# Patient Record
Sex: MALE | Race: BLACK OR AFRICAN AMERICAN
[De-identification: names, ages, dates, MRNs, and addresses within clinical notes are randomized per-mention and may not be internally consistent; named-entity substitution may affect disease eponyms.]

---

## 2018-02-05 ENCOUNTER — HOSPITAL ENCOUNTER (EMERGENCY)
Dept: HOSPITAL 92 - ERS | Age: 68
Discharge: HOME | End: 2018-02-05
Payer: MEDICARE

## 2018-02-05 DIAGNOSIS — Z87.891: ICD-10-CM

## 2018-02-05 DIAGNOSIS — R10.31: Primary | ICD-10-CM

## 2018-02-05 DIAGNOSIS — I10: ICD-10-CM

## 2018-02-05 DIAGNOSIS — I11.0: ICD-10-CM

## 2018-02-05 DIAGNOSIS — Z86.73: ICD-10-CM

## 2018-02-05 DIAGNOSIS — I50.9: ICD-10-CM

## 2018-02-05 DIAGNOSIS — Z79.899: ICD-10-CM

## 2018-02-05 LAB
ALBUMIN SERPL BCG-MCNC: 4.2 G/DL (ref 3.4–4.8)
ALP SERPL-CCNC: 70 U/L (ref 40–150)
ALT SERPL W P-5'-P-CCNC: 16 U/L (ref 8–55)
ANION GAP SERPL CALC-SCNC: 12 MMOL/L (ref 10–20)
ANISOCYTOSIS BLD QL SMEAR: (no result) (100X)
AST SERPL-CCNC: 21 U/L (ref 5–34)
BASOPHILS # BLD AUTO: 0.1 THOU/UL (ref 0–0.2)
BASOPHILS NFR BLD AUTO: 1.1 % (ref 0–1)
BILIRUB SERPL-MCNC: 0.7 MG/DL (ref 0.2–1.2)
BUN SERPL-MCNC: 18 MG/DL (ref 8.4–25.7)
CALCIUM SERPL-MCNC: 9.2 MG/DL (ref 7.8–10.44)
CHLORIDE SERPL-SCNC: 105 MMOL/L (ref 98–107)
CK MB SERPL-MCNC: 7 NG/ML (ref 0–6.6)
CK SERPL-CCNC: 447 U/L (ref 30–200)
CO2 SERPL-SCNC: 28 MMOL/L (ref 23–31)
CREAT CL PREDICTED SERPL C-G-VRATE: 0 ML/MIN (ref 70–130)
EOSINOPHIL # BLD AUTO: 0.1 THOU/UL (ref 0–0.7)
EOSINOPHIL NFR BLD AUTO: 2.1 % (ref 0–10)
GLOBULIN SER CALC-MCNC: 3 G/DL (ref 2.4–3.5)
GLUCOSE SERPL-MCNC: 135 MG/DL (ref 80–115)
HGB BLD-MCNC: 15 G/DL (ref 14–18)
LYMPHOCYTES # BLD: 2.3 THOU/UL (ref 1.2–3.4)
LYMPHOCYTES NFR BLD AUTO: 33.6 % (ref 21–51)
MCH RBC QN AUTO: 23.2 PG (ref 27–31)
MCV RBC AUTO: 74.3 FL (ref 80–94)
MDIFF COMPLETE?: YES
MICROCYTES BLD QL SMEAR: (no result) (100X)
MONOCYTES # BLD AUTO: 0.7 THOU/UL (ref 0.11–0.59)
MONOCYTES NFR BLD AUTO: 10.4 % (ref 0–10)
NEUTROPHILS # BLD AUTO: 3.6 THOU/UL (ref 1.4–6.5)
NEUTROPHILS NFR BLD AUTO: 52.8 % (ref 42–75)
OVALOCYTES BLD QL SMEAR: (no result) (100X)
PLATELET # BLD AUTO: 154 THOU/UL (ref 130–400)
POTASSIUM SERPL-SCNC: 4.3 MMOL/L (ref 3.5–5.1)
RBC # BLD AUTO: 6.47 MILL/UL (ref 4.7–6.1)
SODIUM SERPL-SCNC: 141 MMOL/L (ref 136–145)
SP GR UR STRIP: 1.02 (ref 1–1.04)
TARGETS BLD QL SMEAR: (no result) (100X)
TROPONIN I SERPL DL<=0.01 NG/ML-MCNC: 0.04 NG/ML (ref ?–0.03)
WBC # BLD AUTO: 6.9 THOU/UL (ref 4.8–10.8)

## 2018-02-05 PROCEDURE — 82553 CREATINE MB FRACTION: CPT

## 2018-02-05 PROCEDURE — 74177 CT ABD & PELVIS W/CONTRAST: CPT

## 2018-02-05 PROCEDURE — 36415 COLL VENOUS BLD VENIPUNCTURE: CPT

## 2018-02-05 PROCEDURE — 93005 ELECTROCARDIOGRAM TRACING: CPT

## 2018-02-05 PROCEDURE — 85025 COMPLETE CBC W/AUTO DIFF WBC: CPT

## 2018-02-05 PROCEDURE — 82550 ASSAY OF CK (CPK): CPT

## 2018-02-05 PROCEDURE — 94760 N-INVAS EAR/PLS OXIMETRY 1: CPT

## 2018-02-05 PROCEDURE — 84484 ASSAY OF TROPONIN QUANT: CPT

## 2018-02-05 PROCEDURE — 96374 THER/PROPH/DIAG INJ IV PUSH: CPT

## 2018-02-05 PROCEDURE — 96372 THER/PROPH/DIAG INJ SC/IM: CPT

## 2018-02-05 PROCEDURE — 80053 COMPREHEN METABOLIC PANEL: CPT

## 2018-02-05 PROCEDURE — 71045 X-RAY EXAM CHEST 1 VIEW: CPT

## 2018-02-05 PROCEDURE — 81003 URINALYSIS AUTO W/O SCOPE: CPT

## 2018-02-05 PROCEDURE — 96375 TX/PRO/DX INJ NEW DRUG ADDON: CPT

## 2018-02-05 NOTE — CT
CT ABDOMEN AND PELVIS WITH CONTRAST:

 

Technique: Multiple axial tomograms were obtained through the abdomen and pelvis with IV enhancement.
 

 

History: Abdominal pain. 

 

Comparison: 12-13-17

 

FINDINGS: 

Lung bases are clear. The liver, spleen, and pancreas are unremarkable. Stomach and duodenum are unre
markable. 

 

Adrenal glands and kidneys are unremarkable. No hydronephrosis. 

 

Small bowel loops appear normal. Appendix is not identified. The colon is unremarkable. Airway normal
 caliber. No adenopathy. Images through the pelvis unremarkable. Prostate normal sized. 

 

IMPRESSION: 

No evidence of acute process. 

 

POS: [facility]

## 2018-02-05 NOTE — RAD
CHEST ONE VIEW:

 

History: 

67-year-old male with nausea and vomiting and abdominal pain which began approximately 2 hours ago. 

 

Comparison: 

12-13-17

 

FINDINGS: 

Heart size is within normal limits. Atherosclerotic ectatic changes of the aorta. Horizontal linear a
nd parenchymal changes noted in both bases, overall stable from prior studies. No overt edema, conflu
ent pneumonia, or other acute process. 

 

IMPRESSION: 

Stable horizontal linear and parenchymal changes in the bases. Atherosclerosis of the aorta with ecta
yunior. No acute intrathoracic disease. 

 

POS: [facility]

## 2018-03-07 ENCOUNTER — HOSPITAL ENCOUNTER (EMERGENCY)
Dept: HOSPITAL 92 - ERS | Age: 68
Discharge: HOME | End: 2018-03-07
Payer: MEDICARE

## 2018-03-07 DIAGNOSIS — Z87.891: ICD-10-CM

## 2018-03-07 DIAGNOSIS — Z86.73: ICD-10-CM

## 2018-03-07 DIAGNOSIS — I25.2: ICD-10-CM

## 2018-03-07 DIAGNOSIS — I11.0: ICD-10-CM

## 2018-03-07 DIAGNOSIS — R60.0: Primary | ICD-10-CM

## 2018-03-07 DIAGNOSIS — I67.1: ICD-10-CM

## 2018-03-07 DIAGNOSIS — I50.9: ICD-10-CM

## 2018-03-07 DIAGNOSIS — Z79.899: ICD-10-CM

## 2018-03-07 LAB
ALBUMIN SERPL BCG-MCNC: 4.2 G/DL (ref 3.4–4.8)
ALP SERPL-CCNC: 74 U/L (ref 40–150)
ALT SERPL W P-5'-P-CCNC: 15 U/L (ref 8–55)
ANION GAP SERPL CALC-SCNC: 13 MMOL/L (ref 10–20)
ANISOCYTOSIS BLD QL SMEAR: (no result) (100X)
AST SERPL-CCNC: 20 U/L (ref 5–34)
BASOPHILS # BLD AUTO: 0.1 THOU/UL (ref 0–0.2)
BASOPHILS NFR BLD AUTO: 1 % (ref 0–1)
BILIRUB SERPL-MCNC: 0.9 MG/DL (ref 0.2–1.2)
BUN SERPL-MCNC: 18 MG/DL (ref 8.4–25.7)
CALCIUM SERPL-MCNC: 9.7 MG/DL (ref 7.8–10.44)
CHLORIDE SERPL-SCNC: 101 MMOL/L (ref 98–107)
CK MB SERPL-MCNC: 6.1 NG/ML (ref 0–6.6)
CK SERPL-CCNC: 434 U/L (ref 30–200)
CO2 SERPL-SCNC: 25 MMOL/L (ref 23–31)
CREAT CL PREDICTED SERPL C-G-VRATE: 0 ML/MIN (ref 70–130)
EOSINOPHIL # BLD AUTO: 0.1 THOU/UL (ref 0–0.7)
EOSINOPHIL NFR BLD AUTO: 1 % (ref 0–10)
GLOBULIN SER CALC-MCNC: 3.5 G/DL (ref 2.4–3.5)
GLUCOSE SERPL-MCNC: 110 MG/DL (ref 80–115)
HGB BLD-MCNC: 15.6 G/DL (ref 14–18)
LYMPHOCYTES # BLD: 2.2 THOU/UL (ref 1.2–3.4)
LYMPHOCYTES NFR BLD AUTO: 24.3 % (ref 21–51)
MCH RBC QN AUTO: 23.2 PG (ref 27–31)
MCV RBC AUTO: 72.6 FL (ref 80–94)
MDIFF COMPLETE?: YES
MICROCYTES BLD QL SMEAR: (no result) (100X)
MONOCYTES # BLD AUTO: 1.3 THOU/UL (ref 0.11–0.59)
MONOCYTES NFR BLD AUTO: 14.1 % (ref 0–10)
NEUTROPHILS # BLD AUTO: 5.4 THOU/UL (ref 1.4–6.5)
NEUTROPHILS NFR BLD AUTO: 59.6 % (ref 42–75)
OVALOCYTES BLD QL SMEAR: (no result) (100X)
PLATELET # BLD AUTO: 165 THOU/UL (ref 130–400)
PLATELET BLD QL SMEAR: (no result)
POLYCHROMASIA BLD QL SMEAR: (no result) (100X)
POTASSIUM SERPL-SCNC: 3.9 MMOL/L (ref 3.5–5.1)
RBC # BLD AUTO: 6.7 MILL/UL (ref 4.7–6.1)
SODIUM SERPL-SCNC: 135 MMOL/L (ref 136–145)
TROPONIN I SERPL DL<=0.01 NG/ML-MCNC: 0.03 NG/ML (ref ?–0.03)
WBC # BLD AUTO: 9.1 THOU/UL (ref 4.8–10.8)

## 2018-03-07 PROCEDURE — 82553 CREATINE MB FRACTION: CPT

## 2018-03-07 PROCEDURE — 93970 EXTREMITY STUDY: CPT

## 2018-03-07 PROCEDURE — 36415 COLL VENOUS BLD VENIPUNCTURE: CPT

## 2018-03-07 PROCEDURE — 83880 ASSAY OF NATRIURETIC PEPTIDE: CPT

## 2018-03-07 PROCEDURE — 85025 COMPLETE CBC W/AUTO DIFF WBC: CPT

## 2018-03-07 PROCEDURE — 93005 ELECTROCARDIOGRAM TRACING: CPT

## 2018-03-07 PROCEDURE — 84484 ASSAY OF TROPONIN QUANT: CPT

## 2018-03-07 PROCEDURE — 80053 COMPREHEN METABOLIC PANEL: CPT

## 2018-03-07 PROCEDURE — 71045 X-RAY EXAM CHEST 1 VIEW: CPT

## 2018-03-07 NOTE — ULT
BILATERAL LOWER EXTREMITY VENOUS DUPLEX EXAM:

3/7/18

 

Deep veins of both lower extremities evaluated by ultrasound and doppler. Color doppler with spectral
 analysis and compression studies performed. 

 

INDICATIONS:

Bilateral lower extremity pain and edema. 

 

Deep veins of both lower extremities show normal blood flow and compression. No evidence of DVT. 

 

IMPRESSION:  

No evidence of lower extremity DVT. 

 

POS: [facility]

## 2018-03-07 NOTE — RAD
PORTABLE CHEST ONE VIEW:

 

Date: 3-7-18 

Time: 2:59 p.m.

 

History: Chest pain. 

 

FINDINGS/IMPRESSION: 

Comparison is made with exam of 2-5-18. 

 

The heart is enlarged. The aorta is tortuous. Chronic changes are again seen. No lobar consolidation,
 pneumothoraces, anne marie pulmonary edema or pleural effusions are identified. 

 

POS: [facility]

## 2018-03-10 NOTE — EKG
Test Reason : 

Blood Pressure : ***/*** mmHG

Vent. Rate : 083 BPM     Atrial Rate : 332 BPM

   P-R Int : 000 ms          QRS Dur : 138 ms

    QT Int : 390 ms       P-R-T Axes : 270 -68 079 degrees

   QTc Int : 458 ms

 

Atrial flutter with 4:1 A-V conduction

Right bundle branch block

Left anterior fascicular block

*** Bifascicular block ***

Inferior infarct , age undetermined

Cannot rule out Anterior infarct , age undetermined

Abnormal ECG

 

Confirmed by MIKE JORGE, PEDRO ([id number]),  MEGAN BENNETT ([id number]) on 3/10/2018 3:56:29 PM

 

Referred By:             Confirmed By:PEDRO MCKEON MD

## 2024-12-08 ENCOUNTER — HOSPITAL ENCOUNTER (EMERGENCY)
Dept: HOSPITAL 92 - CSHERS | Age: 74
Discharge: ANOTHER HOSPITAL | End: 2024-12-08
Payer: MEDICARE

## 2024-12-08 ENCOUNTER — HOSPITAL ENCOUNTER (INPATIENT)
Dept: HOSPITAL 92 - OBS | Age: 74
LOS: 10 days | Discharge: HOME | DRG: 280 | End: 2024-12-18
Attending: FAMILY MEDICINE | Admitting: STUDENT IN AN ORGANIZED HEALTH CARE EDUCATION/TRAINING PROGRAM
Payer: MEDICARE

## 2024-12-08 VITALS — BODY MASS INDEX: 38.2 KG/M2 | BODY MASS INDEX: 39 KG/M2

## 2024-12-08 DIAGNOSIS — N17.9: ICD-10-CM

## 2024-12-08 DIAGNOSIS — Z79.01: ICD-10-CM

## 2024-12-08 DIAGNOSIS — J96.01: ICD-10-CM

## 2024-12-08 DIAGNOSIS — I21.A1: ICD-10-CM

## 2024-12-08 DIAGNOSIS — I11.0: ICD-10-CM

## 2024-12-08 DIAGNOSIS — F32.A: ICD-10-CM

## 2024-12-08 DIAGNOSIS — E66.01: ICD-10-CM

## 2024-12-08 DIAGNOSIS — N18.30: ICD-10-CM

## 2024-12-08 DIAGNOSIS — I50.33: ICD-10-CM

## 2024-12-08 DIAGNOSIS — E11.22: ICD-10-CM

## 2024-12-08 DIAGNOSIS — E78.5: ICD-10-CM

## 2024-12-08 DIAGNOSIS — I50.9: ICD-10-CM

## 2024-12-08 DIAGNOSIS — J44.1: ICD-10-CM

## 2024-12-08 DIAGNOSIS — Z87.891: ICD-10-CM

## 2024-12-08 DIAGNOSIS — Z79.899: ICD-10-CM

## 2024-12-08 DIAGNOSIS — I13.0: Primary | ICD-10-CM

## 2024-12-08 DIAGNOSIS — J44.1: Primary | ICD-10-CM

## 2024-12-08 LAB
ALBUMIN SERPL BCG-MCNC: 3.7 G/DL (ref 3.4–4.8)
ALP SERPL-CCNC: 62 U/L (ref 40–110)
ALT SERPL W P-5'-P-CCNC: 19 U/L (ref 8–55)
ANION GAP SERPL CALC-SCNC: 18 MMOL/L (ref 10–20)
ANISOCYTOSIS BLD QL SMEAR: (no result) (100X)
AST SERPL-CCNC: 31 U/L (ref 5–34)
BILIRUB SERPL-MCNC: 2.4 MG/DL (ref 0.2–1.2)
BUN SERPL-MCNC: 32 MG/DL (ref 8.4–25.7)
BURR CELLS BLD QL SMEAR: (no result) (100X)
CALCIUM SERPL-MCNC: 8.8 MG/DL (ref 7.8–10.44)
CHLORIDE SERPL-SCNC: 105 MMOL/L (ref 98–107)
CO2 SERPL-SCNC: 23 MMOL/L (ref 23–31)
CREAT CL PREDICTED SERPL C-G-VRATE: 0 ML/MIN (ref 70–130)
GLOBULIN SER CALC-MCNC: 2.6 G/DL (ref 2.4–3.5)
GLUCOSE SERPL-MCNC: 103 MG/DL (ref 83–110)
HCT VFR BLD CALC: 34 % (ref 38.8–50)
HGB BLD-MCNC: 10.7 G/DL (ref 13.5–17.5)
MAGNESIUM SERPL-MCNC: 1.9 MG/DL (ref 1.6–2.6)
MAGNESIUM SERPL-MCNC: 2 MG/DL (ref 1.6–2.6)
MCH RBC QN AUTO: 22.7 PG (ref 27–33)
MCV RBC AUTO: 72 FL (ref 81.2–95.1)
MDIFF COMPLETE?: YES
MICROCYTES BLD QL SMEAR: (no result) (100X)
NRBC # BLD: 1 %
OVALOCYTES BLD QL SMEAR: (no result) (100X)
PLATELET # BLD AUTO: 228 10X3/UL (ref 150–450)
PLATELET BLD QL SMEAR: (no result)
POIKILOCYTOSIS BLD QL SMEAR: (no result) (100X)
POTASSIUM SERPL-SCNC: 4.1 MMOL/L (ref 3.5–5.1)
PROT UR STRIP.AUTO-MCNC: 100 MG/DL
RBC # BLD AUTO: 4.72 10X6/UL (ref 4.32–5.72)
RBC UR QL AUTO: (no result) HPF (ref 0–3)
SCHISTOCYTES BLD QL SMEAR: (no result) (100X)
SODIUM SERPL-SCNC: 142 MMOL/L (ref 136–145)
SP GR UR STRIP: 1.01 (ref 1–1.04)
TARGETS BLD QL SMEAR: (no result) (100X)
TROPONIN I SERPL DL<=0.01 NG/ML-MCNC: 0.06 NG/ML (ref ?–0.03)
TROPONIN I SERPL DL<=0.01 NG/ML-MCNC: 0.06 NG/ML (ref ?–0.03)
TROPONIN I SERPL DL<=0.01 NG/ML-MCNC: 0.07 NG/ML (ref ?–0.03)
WBC # BLD AUTO: 10.1 10X3/UL (ref 3.5–10.5)
WBC UR QL AUTO: (no result) HPF (ref 0–3)

## 2024-12-08 PROCEDURE — 93005 ELECTROCARDIOGRAM TRACING: CPT

## 2024-12-08 PROCEDURE — 84484 ASSAY OF TROPONIN QUANT: CPT

## 2024-12-08 PROCEDURE — 96374 THER/PROPH/DIAG INJ IV PUSH: CPT

## 2024-12-08 PROCEDURE — 84443 ASSAY THYROID STIM HORMONE: CPT

## 2024-12-08 PROCEDURE — 96375 TX/PRO/DX INJ NEW DRUG ADDON: CPT

## 2024-12-08 PROCEDURE — 36415 COLL VENOUS BLD VENIPUNCTURE: CPT

## 2024-12-08 PROCEDURE — 81001 URINALYSIS AUTO W/SCOPE: CPT

## 2024-12-08 PROCEDURE — 36416 COLLJ CAPILLARY BLOOD SPEC: CPT

## 2024-12-08 PROCEDURE — 80048 BASIC METABOLIC PNL TOTAL CA: CPT

## 2024-12-08 PROCEDURE — 87070 CULTURE OTHR SPECIMN AEROBIC: CPT

## 2024-12-08 PROCEDURE — 83880 ASSAY OF NATRIURETIC PEPTIDE: CPT

## 2024-12-08 PROCEDURE — G0378 HOSPITAL OBSERVATION PER HR: HCPCS

## 2024-12-08 PROCEDURE — 83735 ASSAY OF MAGNESIUM: CPT

## 2024-12-08 PROCEDURE — 71045 X-RAY EXAM CHEST 1 VIEW: CPT

## 2024-12-08 PROCEDURE — 85025 COMPLETE CBC W/AUTO DIFF WBC: CPT

## 2024-12-08 PROCEDURE — 93970 EXTREMITY STUDY: CPT

## 2024-12-08 PROCEDURE — 74176 CT ABD & PELVIS W/O CONTRAST: CPT

## 2024-12-08 PROCEDURE — 93306 TTE W/DOPPLER COMPLETE: CPT

## 2024-12-08 PROCEDURE — 96376 TX/PRO/DX INJ SAME DRUG ADON: CPT

## 2024-12-08 PROCEDURE — 87205 SMEAR GRAM STAIN: CPT

## 2024-12-08 PROCEDURE — 93010 ELECTROCARDIOGRAM REPORT: CPT

## 2024-12-08 PROCEDURE — 94640 AIRWAY INHALATION TREATMENT: CPT

## 2024-12-08 PROCEDURE — 71250 CT THORAX DX C-: CPT

## 2024-12-08 PROCEDURE — 80053 COMPREHEN METABOLIC PANEL: CPT

## 2024-12-08 PROCEDURE — 84145 PROCALCITONIN (PCT): CPT

## 2024-12-08 PROCEDURE — 97139 UNLISTED THERAPEUTIC PX: CPT

## 2024-12-08 PROCEDURE — 87040 BLOOD CULTURE FOR BACTERIA: CPT

## 2024-12-08 PROCEDURE — 96365 THER/PROPH/DIAG IV INF INIT: CPT

## 2024-12-08 PROCEDURE — 96367 TX/PROPH/DG ADDL SEQ IV INF: CPT

## 2024-12-08 PROCEDURE — 85060 BLOOD SMEAR INTERPRETATION: CPT

## 2024-12-09 LAB
ANION GAP SERPL CALC-SCNC: 14 MMOL/L (ref 10–20)
ANISOCYTOSIS BLD QL SMEAR: (no result) HPF (ref 0–5)
BASOPHILS # BLD AUTO: (no result) 10X3/UL (ref 0–0.2)
BASOPHILS NFR BLD AUTO: 0 % (ref 0–1)
BUN SERPL-MCNC: 31 MG/DL (ref 8.4–25.7)
BURR CELLS BLD QL SMEAR: (no result) HPF (ref 0–1)
CALCIUM SERPL-MCNC: 8.7 MG/DL (ref 7.8–10.44)
CHLORIDE SERPL-SCNC: 106 MMOL/L (ref 98–107)
CO2 SERPL-SCNC: 27 MMOL/L (ref 23–31)
CREAT CL PREDICTED SERPL C-G-VRATE: 83 ML/MIN (ref 70–130)
EOSINOPHIL # BLD AUTO: (no result) 10X3/UL (ref 0–0.7)
EOSINOPHIL NFR BLD AUTO: 0 % (ref 0–10)
GLUCOSE SERPL-MCNC: 194 MG/DL (ref 83–110)
HCT VFR BLD CALC: 34.3 % (ref 42–52)
HGB BLD-MCNC: 10.4 G/DL (ref 14–18)
LYMPHOCYTES NFR BLD AUTO: 11.9 % (ref 21–51)
MCH RBC QN AUTO: 22.2 PG (ref 27–31)
MCV RBC AUTO: 73.3 FL (ref 78–98)
MICROCYTES BLD QL SMEAR: (no result) HPF (ref 0–5)
MONOCYTES # BLD AUTO: 0.3 10X3/UL (ref 0.11–0.59)
MONOCYTES NFR BLD AUTO: 5.4 % (ref 0–10)
NEUTROPHILS # BLD AUTO: 4.7 10X3/UL (ref 1.4–6.5)
NEUTROPHILS NFR BLD AUTO: 82.5 % (ref 42–75)
OVALOCYTES BLD QL SMEAR: (no result) HPF (ref 0–1)
PLATELET # BLD AUTO: 343 10X3/UL (ref 130–400)
POIKILOCYTOSIS BLD QL SMEAR: (no result) HPF (ref 0–5)
POLYCHROMASIA BLD QL SMEAR: (no result) HPF (ref 0–2)
POTASSIUM SERPL-SCNC: 4.4 MMOL/L (ref 3.5–5.1)
RBC # BLD AUTO: 4.68 MILL/UL (ref 4.7–6.1)
REFLEX FOR REVIEW??: YES
SCHISTOCYTES BLD QL SMEAR: (no result) HPF (ref 0–1)
SODIUM SERPL-SCNC: 143 MMOL/L (ref 136–145)
TARGETS BLD QL SMEAR: (no result) HPF (ref 0–1)
TROPONIN I SERPL DL<=0.01 NG/ML-MCNC: 0.05 NG/ML (ref ?–0.03)
WBC # BLD AUTO: 5.7 10X3/UL (ref 4.8–10.8)

## 2024-12-09 RX ADMIN — FUROSEMIDE SCH MG: 10 INJECTION, SOLUTION INTRAVENOUS at 05:18

## 2024-12-09 RX ADMIN — HEPARIN SODIUM SCH: 5000 INJECTION, SOLUTION INTRAVENOUS; SUBCUTANEOUS at 11:06

## 2024-12-10 LAB
ANION GAP SERPL CALC-SCNC: 14 MMOL/L (ref 10–20)
BASOPHILS # BLD AUTO: (no result) 10X3/UL (ref 0–0.2)
BASOPHILS NFR BLD AUTO: 0.1 % (ref 0–1)
BUN SERPL-MCNC: 37 MG/DL (ref 8.4–25.7)
CALCIUM SERPL-MCNC: 8.7 MG/DL (ref 7.8–10.44)
CHLORIDE SERPL-SCNC: 104 MMOL/L (ref 98–107)
CO2 SERPL-SCNC: 27 MMOL/L (ref 23–31)
CREAT CL PREDICTED SERPL C-G-VRATE: 73 ML/MIN (ref 70–130)
EOSINOPHIL # BLD AUTO: (no result) 10X3/UL (ref 0–0.7)
EOSINOPHIL NFR BLD AUTO: 0 % (ref 0–10)
GIANT PLATELETS BLD QL SMEAR: 1 % (ref 0–5)
GLUCOSE SERPL-MCNC: 135 MG/DL (ref 83–110)
HCT VFR BLD CALC: 34.4 % (ref 42–52)
HGB BLD-MCNC: 10.6 G/DL (ref 14–18)
LYMPHOCYTES NFR BLD AUTO: 7.1 % (ref 21–51)
MCH RBC QN AUTO: 22.5 PG (ref 27–31)
MCV RBC AUTO: 72.9 FL (ref 78–98)
MICROCYTES BLD QL SMEAR: (no result) HPF (ref 0–5)
MONOCYTES # BLD AUTO: 1.6 10X3/UL (ref 0.11–0.59)
MONOCYTES NFR BLD AUTO: 15.2 % (ref 0–10)
NEUTROPHILS # BLD AUTO: 8.2 10X3/UL (ref 1.4–6.5)
NEUTROPHILS NFR BLD AUTO: 77.2 % (ref 42–75)
PLATELET # BLD AUTO: 363 10X3/UL (ref 130–400)
POLYCHROMASIA BLD QL SMEAR: (no result) HPF (ref 0–2)
POTASSIUM SERPL-SCNC: 4.3 MMOL/L (ref 3.5–5.1)
RBC # BLD AUTO: 4.72 MILL/UL (ref 4.7–6.1)
SCHISTOCYTES BLD QL SMEAR: (no result) HPF (ref 0–1)
SMUDGE CELLS BLD QL SMEAR: 2 %
SODIUM SERPL-SCNC: 141 MMOL/L (ref 136–145)
WBC # BLD AUTO: 10.6 10X3/UL (ref 4.8–10.8)

## 2024-12-10 RX ADMIN — HYDROCODONE BITARTRATE AND ACETAMINOPHEN PRN TAB: 10; 325 TABLET ORAL at 08:58

## 2024-12-11 LAB
ANION GAP SERPL CALC-SCNC: 16 MMOL/L (ref 10–20)
BASOPHILS # BLD AUTO: (no result) 10X3/UL (ref 0–0.2)
BASOPHILS NFR BLD AUTO: 0.1 % (ref 0–1)
BUN SERPL-MCNC: 41 MG/DL (ref 8.4–25.7)
CALCIUM SERPL-MCNC: 9 MG/DL (ref 7.8–10.44)
CHLORIDE SERPL-SCNC: 104 MMOL/L (ref 98–107)
CO2 SERPL-SCNC: 25 MMOL/L (ref 23–31)
CREAT CL PREDICTED SERPL C-G-VRATE: 68 ML/MIN (ref 70–130)
EOSINOPHIL # BLD AUTO: (no result) 10X3/UL (ref 0–0.7)
EOSINOPHIL NFR BLD AUTO: 0 % (ref 0–10)
GLUCOSE SERPL-MCNC: 165 MG/DL (ref 83–110)
HCT VFR BLD CALC: 34.8 % (ref 42–52)
HGB BLD-MCNC: 10.8 G/DL (ref 14–18)
LYMPHOCYTES NFR BLD AUTO: 4.3 % (ref 21–51)
MAGNESIUM SERPL-MCNC: 1.9 MG/DL (ref 1.6–2.6)
MCH RBC QN AUTO: 22.4 PG (ref 27–31)
MCV RBC AUTO: 72.2 FL (ref 78–98)
MICROCYTES BLD QL SMEAR: (no result) HPF (ref 0–5)
MONOCYTES # BLD AUTO: 1 10X3/UL (ref 0.11–0.59)
MONOCYTES NFR BLD AUTO: 9.9 % (ref 0–10)
NEUTROPHILS # BLD AUTO: 8.9 10X3/UL (ref 1.4–6.5)
NEUTROPHILS NFR BLD AUTO: 85.3 % (ref 42–75)
PLATELET # BLD AUTO: 386 10X3/UL (ref 130–400)
POLYCHROMASIA BLD QL SMEAR: (no result) HPF (ref 0–2)
POTASSIUM SERPL-SCNC: 4.7 MMOL/L (ref 3.5–5.1)
RBC # BLD AUTO: 4.82 MILL/UL (ref 4.7–6.1)
SCHISTOCYTES BLD QL SMEAR: (no result) HPF (ref 0–1)
SODIUM SERPL-SCNC: 140 MMOL/L (ref 136–145)
WBC # BLD AUTO: 10.4 10X3/UL (ref 4.8–10.8)

## 2024-12-12 LAB
ANION GAP SERPL CALC-SCNC: 15 MMOL/L (ref 10–20)
BASOPHILS # BLD AUTO: (no result) 10X3/UL (ref 0–0.2)
BASOPHILS NFR BLD AUTO: 0 % (ref 0–1)
BUN SERPL-MCNC: 50 MG/DL (ref 8.4–25.7)
CALCIUM SERPL-MCNC: 9.5 MG/DL (ref 7.8–10.44)
CHLORIDE SERPL-SCNC: 106 MMOL/L (ref 98–107)
CO2 SERPL-SCNC: 25 MMOL/L (ref 23–31)
CREAT CL PREDICTED SERPL C-G-VRATE: 72 ML/MIN (ref 70–130)
EOSINOPHIL # BLD AUTO: (no result) 10X3/UL (ref 0–0.7)
EOSINOPHIL NFR BLD AUTO: 0 % (ref 0–10)
GLUCOSE SERPL-MCNC: 136 MG/DL (ref 83–110)
HCT VFR BLD CALC: 36 % (ref 42–52)
HGB BLD-MCNC: 11.2 G/DL (ref 14–18)
LYMPHOCYTES NFR BLD AUTO: 6.3 % (ref 21–51)
MCH RBC QN AUTO: 22.6 PG (ref 27–31)
MCV RBC AUTO: 72.7 FL (ref 78–98)
MONOCYTES # BLD AUTO: 1.3 10X3/UL (ref 0.11–0.59)
MONOCYTES NFR BLD AUTO: 14.3 % (ref 0–10)
NEUTROPHILS # BLD AUTO: 7.3 10X3/UL (ref 1.4–6.5)
NEUTROPHILS NFR BLD AUTO: 79.1 % (ref 42–75)
PLATELET # BLD AUTO: 358 10X3/UL (ref 130–400)
POTASSIUM SERPL-SCNC: 4.7 MMOL/L (ref 3.5–5.1)
RBC # BLD AUTO: 4.95 MILL/UL (ref 4.7–6.1)
SODIUM SERPL-SCNC: 141 MMOL/L (ref 136–145)
WBC # BLD AUTO: 9.2 10X3/UL (ref 4.8–10.8)

## 2024-12-13 LAB
ANION GAP SERPL CALC-SCNC: 16 MMOL/L (ref 10–20)
ANISOCYTOSIS BLD QL SMEAR: (no result) HPF (ref 0–5)
BASOPHILS # BLD AUTO: (no result) 10X3/UL (ref 0–0.2)
BASOPHILS NFR BLD AUTO: 0 % (ref 0–1)
BUN SERPL-MCNC: 52 MG/DL (ref 8.4–25.7)
BURR CELLS BLD QL SMEAR: (no result) HPF (ref 0–1)
CALCIUM SERPL-MCNC: 8.9 MG/DL (ref 7.8–10.44)
CHLORIDE SERPL-SCNC: 104 MMOL/L (ref 98–107)
CO2 SERPL-SCNC: 25 MMOL/L (ref 23–31)
CREAT CL PREDICTED SERPL C-G-VRATE: 72 ML/MIN (ref 70–130)
EOSINOPHIL # BLD AUTO: (no result) 10X3/UL (ref 0–0.7)
EOSINOPHIL NFR BLD AUTO: 0 % (ref 0–10)
GLUCOSE SERPL-MCNC: 158 MG/DL (ref 83–110)
HCT VFR BLD CALC: 34 % (ref 42–52)
HGB BLD-MCNC: 10.6 G/DL (ref 14–18)
LYMPHOCYTES NFR BLD AUTO: 7.7 % (ref 21–51)
MACROCYTES BLD QL SMEAR: (no result) HPF (ref 0–5)
MCH RBC QN AUTO: 22.3 PG (ref 27–31)
MCV RBC AUTO: 71.4 FL (ref 78–98)
MONOCYTES # BLD AUTO: 0.5 10X3/UL (ref 0.11–0.59)
MONOCYTES NFR BLD AUTO: 8.2 % (ref 0–10)
NEUTROPHILS # BLD AUTO: 5.5 10X3/UL (ref 1.4–6.5)
NEUTROPHILS NFR BLD AUTO: 83.6 % (ref 42–75)
OVALOCYTES BLD QL SMEAR: (no result) HPF (ref 0–1)
PLATELET # BLD AUTO: 334 10X3/UL (ref 130–400)
POIKILOCYTOSIS BLD QL SMEAR: (no result) HPF (ref 0–5)
POLYCHROMASIA BLD QL SMEAR: (no result) HPF (ref 0–2)
POTASSIUM SERPL-SCNC: 4.9 MMOL/L (ref 3.5–5.1)
RBC # BLD AUTO: 4.76 MILL/UL (ref 4.7–6.1)
SCHISTOCYTES BLD QL SMEAR: (no result) HPF (ref 0–1)
SODIUM SERPL-SCNC: 140 MMOL/L (ref 136–145)
WBC # BLD AUTO: 6.6 10X3/UL (ref 4.8–10.8)

## 2024-12-14 LAB
ANION GAP SERPL CALC-SCNC: 14 MMOL/L (ref 10–20)
ANISOCYTOSIS BLD QL SMEAR: (no result) HPF (ref 0–5)
BASOPHILS # BLD AUTO: (no result) 10X3/UL (ref 0–0.2)
BASOPHILS NFR BLD AUTO: 0 % (ref 0–1)
BUN SERPL-MCNC: 50 MG/DL (ref 8.4–25.7)
CALCIUM SERPL-MCNC: 8.6 MG/DL (ref 7.8–10.44)
CHLORIDE SERPL-SCNC: 104 MMOL/L (ref 98–107)
CO2 SERPL-SCNC: 26 MMOL/L (ref 23–31)
CREAT CL PREDICTED SERPL C-G-VRATE: 77 ML/MIN (ref 70–130)
EOSINOPHIL # BLD AUTO: (no result) 10X3/UL (ref 0–0.7)
EOSINOPHIL NFR BLD AUTO: 0 % (ref 0–10)
GLUCOSE SERPL-MCNC: 126 MG/DL (ref 83–110)
HCT VFR BLD CALC: 31.5 % (ref 42–52)
HGB BLD-MCNC: 9.9 G/DL (ref 14–18)
LYMPHOCYTES NFR BLD AUTO: 11.7 % (ref 21–51)
MCH RBC QN AUTO: 22.6 PG (ref 27–31)
MCV RBC AUTO: 71.8 FL (ref 78–98)
MICROCYTES BLD QL SMEAR: (no result) HPF (ref 0–5)
MONOCYTES # BLD AUTO: 1.3 10X3/UL (ref 0.11–0.59)
MONOCYTES NFR BLD AUTO: 19.8 % (ref 0–10)
NEUTROPHILS # BLD AUTO: 4.4 10X3/UL (ref 1.4–6.5)
NEUTROPHILS NFR BLD AUTO: 68 % (ref 42–75)
PLATELET # BLD AUTO: 303 10X3/UL (ref 130–400)
POIKILOCYTOSIS BLD QL SMEAR: (no result) HPF (ref 0–5)
POLYCHROMASIA BLD QL SMEAR: (no result) HPF (ref 0–2)
POTASSIUM SERPL-SCNC: 4.7 MMOL/L (ref 3.5–5.1)
RBC # BLD AUTO: 4.39 MILL/UL (ref 4.7–6.1)
SCHISTOCYTES BLD QL SMEAR: (no result) HPF (ref 0–1)
SODIUM SERPL-SCNC: 139 MMOL/L (ref 136–145)
WBC # BLD AUTO: 6.5 10X3/UL (ref 4.8–10.8)

## 2024-12-14 RX ADMIN — FUROSEMIDE SCH MG: 10 INJECTION, SOLUTION INTRAVENOUS at 08:57

## 2024-12-15 LAB
ANION GAP SERPL CALC-SCNC: 16 MMOL/L (ref 10–20)
ANISOCYTOSIS BLD QL SMEAR: (no result) HPF (ref 0–5)
BUN SERPL-MCNC: 48 MG/DL (ref 8.4–25.7)
CALCIUM SERPL-MCNC: 8.8 MG/DL (ref 7.8–10.44)
CHLORIDE SERPL-SCNC: 104 MMOL/L (ref 98–107)
CO2 SERPL-SCNC: 25 MMOL/L (ref 23–31)
CREAT CL PREDICTED SERPL C-G-VRATE: 78 ML/MIN (ref 70–130)
GLUCOSE SERPL-MCNC: 104 MG/DL (ref 83–110)
HCT VFR BLD CALC: 33.2 % (ref 42–52)
HGB BLD-MCNC: 10.3 G/DL (ref 14–18)
MACROCYTES BLD QL SMEAR: (no result) HPF (ref 0–5)
MAGNESIUM SERPL-MCNC: 2.1 MG/DL (ref 1.6–2.6)
MCH RBC QN AUTO: 22.7 PG (ref 27–31)
MCV RBC AUTO: 73.1 FL (ref 78–98)
PLATELET # BLD AUTO: 309 10X3/UL (ref 130–400)
POIKILOCYTOSIS BLD QL SMEAR: (no result) HPF (ref 0–5)
POLYCHROMASIA BLD QL SMEAR: (no result) HPF (ref 0–2)
POTASSIUM SERPL-SCNC: 4.5 MMOL/L (ref 3.5–5.1)
RBC # BLD AUTO: 4.54 MILL/UL (ref 4.7–6.1)
SCHISTOCYTES BLD QL SMEAR: (no result) HPF (ref 0–1)
SMUDGE CELLS BLD QL SMEAR: 11 %
SODIUM SERPL-SCNC: 140 MMOL/L (ref 136–145)
WBC # BLD AUTO: 9.3 10X3/UL (ref 4.8–10.8)

## 2024-12-16 LAB
ANION GAP SERPL CALC-SCNC: 12 MMOL/L (ref 10–20)
ANISOCYTOSIS BLD QL SMEAR: (no result) HPF (ref 0–5)
BASOPHILS # BLD AUTO: (no result) 10X3/UL (ref 0–0.2)
BASOPHILS NFR BLD AUTO: 0 % (ref 0–1)
BUN SERPL-MCNC: 43 MG/DL (ref 8.4–25.7)
CALCIUM SERPL-MCNC: 8.4 MG/DL (ref 7.8–10.44)
CHLORIDE SERPL-SCNC: 98 MMOL/L (ref 98–107)
CO2 SERPL-SCNC: 35 MMOL/L (ref 23–31)
CREAT CL PREDICTED SERPL C-G-VRATE: 82 ML/MIN (ref 70–130)
EOSINOPHIL # BLD AUTO: (no result) 10X3/UL (ref 0–0.7)
EOSINOPHIL NFR BLD AUTO: 0.1 % (ref 0–10)
GLUCOSE SERPL-MCNC: 94 MG/DL (ref 83–110)
HCT VFR BLD CALC: 32.8 % (ref 42–52)
HGB BLD-MCNC: 10.3 G/DL (ref 14–18)
LYMPHOCYTES NFR BLD AUTO: 11.4 % (ref 21–51)
MCH RBC QN AUTO: 22.4 PG (ref 27–31)
MCV RBC AUTO: 71.3 FL (ref 78–98)
MICROCYTES BLD QL SMEAR: (no result) HPF (ref 0–5)
MONOCYTES # BLD AUTO: 1.6 10X3/UL (ref 0.11–0.59)
MONOCYTES NFR BLD AUTO: 18.1 % (ref 0–10)
NEUTROPHILS # BLD AUTO: 6.2 10X3/UL (ref 1.4–6.5)
NEUTROPHILS NFR BLD AUTO: 70.2 % (ref 42–75)
PLATELET # BLD AUTO: 287 10X3/UL (ref 130–400)
POIKILOCYTOSIS BLD QL SMEAR: (no result) HPF (ref 0–5)
POTASSIUM SERPL-SCNC: 4.2 MMOL/L (ref 3.5–5.1)
RBC # BLD AUTO: 4.6 MILL/UL (ref 4.7–6.1)
SCHISTOCYTES BLD QL SMEAR: (no result) HPF (ref 0–1)
SODIUM SERPL-SCNC: 141 MMOL/L (ref 136–145)
WBC # BLD AUTO: 8.9 10X3/UL (ref 4.8–10.8)

## 2024-12-17 LAB
ANION GAP SERPL CALC-SCNC: 13 MMOL/L (ref 10–20)
BASOPHILS # BLD AUTO: (no result) 10X3/UL (ref 0–0.2)
BASOPHILS NFR BLD AUTO: 0.1 % (ref 0–1)
BUN SERPL-MCNC: 35 MG/DL (ref 8.4–25.7)
CALCIUM SERPL-MCNC: 9 MG/DL (ref 7.8–10.44)
CHLORIDE SERPL-SCNC: 94 MMOL/L (ref 98–107)
CO2 SERPL-SCNC: 37 MMOL/L (ref 23–31)
CREAT CL PREDICTED SERPL C-G-VRATE: 70 ML/MIN (ref 70–130)
EOSINOPHIL # BLD AUTO: 0 10X3/UL (ref 0–0.7)
EOSINOPHIL NFR BLD AUTO: 0.3 % (ref 0–10)
GLUCOSE SERPL-MCNC: 115 MG/DL (ref 83–110)
HCT VFR BLD CALC: 34.9 % (ref 42–52)
HGB BLD-MCNC: 10.8 G/DL (ref 14–18)
LYMPHOCYTES NFR BLD AUTO: 14.1 % (ref 21–51)
MCH RBC QN AUTO: 22.7 PG (ref 27–31)
MCV RBC AUTO: 73.3 FL (ref 78–98)
MONOCYTES # BLD AUTO: 1.8 10X3/UL (ref 0.11–0.59)
MONOCYTES NFR BLD AUTO: 17.5 % (ref 0–10)
NEUTROPHILS # BLD AUTO: 6.9 10X3/UL (ref 1.4–6.5)
NEUTROPHILS NFR BLD AUTO: 67.6 % (ref 42–75)
PLATELET # BLD AUTO: 231 10X3/UL (ref 130–400)
POTASSIUM SERPL-SCNC: 3.8 MMOL/L (ref 3.5–5.1)
RBC # BLD AUTO: 4.76 MILL/UL (ref 4.7–6.1)
SODIUM SERPL-SCNC: 140 MMOL/L (ref 136–145)
WBC # BLD AUTO: 10.2 10X3/UL (ref 4.8–10.8)

## 2024-12-18 VITALS — DIASTOLIC BLOOD PRESSURE: 65 MMHG | SYSTOLIC BLOOD PRESSURE: 120 MMHG | TEMPERATURE: 97.6 F

## 2024-12-18 LAB
ANION GAP SERPL CALC-SCNC: 16 MMOL/L (ref 10–20)
BASOPHILS # BLD AUTO: (no result) 10X3/UL (ref 0–0.2)
BASOPHILS NFR BLD AUTO: 0.1 % (ref 0–1)
BUN SERPL-MCNC: 32 MG/DL (ref 8.4–25.7)
CALCIUM SERPL-MCNC: 9.7 MG/DL (ref 7.8–10.44)
CHLORIDE SERPL-SCNC: 92 MMOL/L (ref 98–107)
CO2 SERPL-SCNC: 34 MMOL/L (ref 23–31)
CREAT CL PREDICTED SERPL C-G-VRATE: 65 ML/MIN (ref 70–130)
EOSINOPHIL # BLD AUTO: 0.1 10X3/UL (ref 0–0.7)
EOSINOPHIL NFR BLD AUTO: 0.4 % (ref 0–10)
GLUCOSE SERPL-MCNC: 112 MG/DL (ref 83–110)
HCT VFR BLD CALC: 40.4 % (ref 42–52)
HGB BLD-MCNC: 12.4 G/DL (ref 14–18)
LYMPHOCYTES NFR BLD AUTO: 10.7 % (ref 21–51)
MAGNESIUM SERPL-MCNC: 1.8 MG/DL (ref 1.6–2.6)
MCH RBC QN AUTO: 22.6 PG (ref 27–31)
MCV RBC AUTO: 73.6 FL (ref 78–98)
MONOCYTES # BLD AUTO: 1.8 10X3/UL (ref 0.11–0.59)
MONOCYTES NFR BLD AUTO: 14.9 % (ref 0–10)
NEUTROPHILS # BLD AUTO: 9 10X3/UL (ref 1.4–6.5)
NEUTROPHILS NFR BLD AUTO: 73.6 % (ref 42–75)
PLATELET # BLD AUTO: 218 10X3/UL (ref 130–400)
POTASSIUM SERPL-SCNC: 4.3 MMOL/L (ref 3.5–5.1)
RBC # BLD AUTO: 5.49 MILL/UL (ref 4.7–6.1)
SODIUM SERPL-SCNC: 138 MMOL/L (ref 136–145)
WBC # BLD AUTO: 12.2 10X3/UL (ref 4.8–10.8)

## 2024-12-20 ENCOUNTER — HOSPITAL ENCOUNTER (EMERGENCY)
Dept: HOSPITAL 92 - ERS | Age: 74
Discharge: HOME | End: 2024-12-20
Payer: MEDICARE

## 2024-12-20 DIAGNOSIS — I50.9: ICD-10-CM

## 2024-12-20 DIAGNOSIS — I11.0: ICD-10-CM

## 2024-12-20 DIAGNOSIS — S42.402A: Primary | ICD-10-CM

## 2024-12-20 DIAGNOSIS — Z55.0: ICD-10-CM
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
## 2024-12-20 PROCEDURE — 93005 ELECTROCARDIOGRAM TRACING: CPT

## 2024-12-20 PROCEDURE — 29105 APPLICATION LONG ARM SPLINT: CPT

## 2024-12-20 SDOH — EDUCATIONAL SECURITY - EDUCATION ATTAINMENT: ILITERACY AND LOW LEVEL LITERACY: Z55.0
